# Patient Record
Sex: MALE | Race: BLACK OR AFRICAN AMERICAN | Employment: UNEMPLOYED | URBAN - METROPOLITAN AREA
[De-identification: names, ages, dates, MRNs, and addresses within clinical notes are randomized per-mention and may not be internally consistent; named-entity substitution may affect disease eponyms.]

---

## 2020-02-27 ENCOUNTER — OFFICE VISIT (OUTPATIENT)
Dept: FAMILY MEDICINE CLINIC | Age: 35
End: 2020-02-27

## 2020-02-27 VITALS
WEIGHT: 174 LBS | HEART RATE: 97 BPM | SYSTOLIC BLOOD PRESSURE: 118 MMHG | BODY MASS INDEX: 27.97 KG/M2 | OXYGEN SATURATION: 100 % | RESPIRATION RATE: 16 BRPM | TEMPERATURE: 98.7 F | DIASTOLIC BLOOD PRESSURE: 79 MMHG | HEIGHT: 66 IN

## 2020-02-27 DIAGNOSIS — Z82.49 FAMILY HISTORY OF HEART ATTACK: ICD-10-CM

## 2020-02-27 DIAGNOSIS — Z20.2 EXPOSURE TO SEXUALLY TRANSMITTED DISEASE (STD): ICD-10-CM

## 2020-02-27 DIAGNOSIS — Z20.2 EXPOSURE TO SEXUALLY TRANSMITTED DISEASE (STD): Primary | ICD-10-CM

## 2020-02-27 RX ORDER — METRONIDAZOLE 500 MG/1
2000 TABLET ORAL ONCE
Qty: 4 TAB | Refills: 0 | Status: SHIPPED | OUTPATIENT
Start: 2020-02-27 | End: 2020-02-27

## 2020-02-27 NOTE — PROGRESS NOTES
Darinel Paredes  37 y.o. male  1985  [id number]:[id number]    [facility] FAMILY MEDICINE  Progress Note     Encounter Date: 2/27/2020    Assessment and Plan:     Encounter Diagnoses     ICD-10-CM ICD-9-CM   1. Exposure to sexually transmitted disease (STD) Z20.2 V01.6   2. Family history of heart attack Z82.49 V17.3     1. Exposure to sexually transmitted disease (STD)  - metroNIDAZOLE (FLAGYL) 500 mg tablet; Take 4 Tabs by mouth once for 1 dose. Dispense: 4 Tab; Refill: 0  - CT/NG/T.VAGINALIS AMPLIFICATION; Future  - HBV CORE AB, IGG/IGM; Future  - HIV 1/2 AG/AB, 4TH GENERATION,W RFLX CONFIRM; Future  - T PALLIDUM SCREEN W/REFLEX; Future    2. Family history of heart attack  - METABOLIC PANEL, BASIC; Future  - LIPID PANEL; Future      I have discussed the diagnosis with the patient and the intended plan as seen in the above orders. he has expressed understanding. The patient has received an after-visit summary and questions were answered concerning future plans. I have discussed medication side effects and warnings with the patient as well. Electronically Signed: Fernando Salvador MD    Current Medications after this visit     Current Outpatient Medications   Medication Sig    metroNIDAZOLE (FLAGYL) 500 mg tablet Take 4 Tabs by mouth once for 1 dose. No current facility-administered medications for this visit.       Medications Discontinued During This Encounter   Medication Reason    cyclobenzaprine (FLEXERIL) 10 mg tablet Not A Current Medication    meloxicam (MOBIC) 15 mg tablet Not A Current Medication    HYDROcodone-acetaminophen (NORCO) 5-325 mg per tablet Not A Current Medication     ~~~~~~~~~~~~~~~~~~~~~~~~~~~~~~~~~~~~~~~~~~~~~~    Chief Complaint   Patient presents with   [address] [place] [address] Other      states that poss std/trichomoiasis       History provided by patient  History of Present Illness   Darinel Paredes is a 37 y.o. male who presents to clinic today for:      [address] patient who presents to establish PCP care. I personally reviewed and updated the patient's medical, surgical, family and social history. PREVIOUS PRIMARY CARE PROVIDER and SPECIALISTS  None. Patient decided to come to [address] due to current issue. RECORDS  Provided by patient: none     SPECIALISTS  Patient Care Team:  Angella Contreras MD as PCP - [facility])    Establish Care  Patient present with cc of establish care. Patient states that his girlfriend was diagnosed with trichomoniasis. He denies penile discharge, dysuria, rash or genital lesions. Health Maintenance  There are no preventive care reminders to display for this patient. Review of Systems   Review of Systems   Constitutional: Negative for chills and fever. HENT: Negative for congestion, ear discharge and sore throat. Eyes: Negative for double vision, photophobia and discharge. Respiratory: Negative for cough, sputum production, shortness of breath and wheezing. Cardiovascular: Negative for chest pain, palpitations and leg swelling. Gastrointestinal: Negative for diarrhea, nausea and vomiting. Genitourinary: Negative for dysuria and urgency. Skin: Negative. Neurological: Negative for dizziness, tremors and headaches. Vitals/Objective:     Vitals:    02/27/20 1503   BP: 118/79   Pulse: 97   Resp: 16   Temp: 98.7 °F (37.1 °C)   TempSrc: Oral   SpO2: 100%   Weight: 174 lb (78.9 kg)   Height: 5' 6\" (1.676 m)     Body mass index is 28.08 kg/m². Wt Readings from Last 3 Encounters:   02/27/20 174 lb (78.9 kg)   03/04/15 160 lb (72.6 kg)   05/22/14 160 lb (72.6 kg)       Physical Exam  Constitutional:       General: He is not in acute distress. Appearance: Normal appearance. He is well-developed. He is not diaphoretic. HENT:      Head: Normocephalic and atraumatic.       Right Ear: Tympanic membrane, ear canal and external ear normal.      Left Ear: Tympanic membrane, ear canal and external ear normal.      Mouth/Throat:      Pharynx: No oropharyngeal exudate or posterior oropharyngeal erythema. Eyes:      General:         Right eye: No discharge. Left eye: No discharge. Conjunctiva/sclera: Conjunctivae normal.   Cardiovascular:      Rate and Rhythm: Normal rate and regular rhythm. Heart sounds: S1 normal and S2 normal. No murmur. Pulmonary:      Effort: Pulmonary effort is normal.      Breath sounds: Normal breath sounds. No rales. Musculoskeletal:      Right lower leg: No edema. Left lower leg: No edema. Skin:     General: Skin is warm and dry. Capillary Refill: Capillary refill takes less than 2 seconds. Neurological:      Mental Status: He is alert and oriented to person, place, and time. No results found for this or any previous visit (from the past 24 hour(s)). Disposition     Follow-up and Dispositions  ·   Return if symptoms worsen or fail to improve. No future appointments. History   Patient's past medical, surgical and family histories were reviewed and updated. History reviewed. No pertinent past medical history. History reviewed. No pertinent surgical history.   Family History   Problem Relation Age of Onset    Heart Attack Mother 48    No Known Problems Father      Social History     Tobacco Use    Smoking status: Current Every Day Smoker    Smokeless tobacco: Current User   Substance Use Topics    Alcohol use: No    Drug use: No       Allergies   No Known Allergies

## 2020-02-27 NOTE — PROGRESS NOTES
Identified pt with two pt identifiers(name and ). Reviewed record in preparation for visit and have obtained necessary documentation. Chief Complaint   Patient presents with   [address] [facility] [address] Other     Pt states that poss std/trichomoiasis        Health Maintenance Due   Topic    Influenza Age 5 to Adult    -Pt declines flu shot     Coordination of Care Questionnaire:  :   1) Have you been to an emergency room, urgent care, or hospitalized since your last visit? If yes, where when, and reason for visit? Yes, [facility]  2. Have seen or consulted any other health care provider since your last visit? If yes, where when, and reason for visit? No         Patient is accompanied by self  I have received verbal consent from Patrick Marino to discuss any/all medical information while they are present in the room.

## 2020-02-27 NOTE — PATIENT INSTRUCTIONS
Exposure to Sexually Transmitted Infections: Care Instructions Your Care Instructions Sexually transmitted infections (STIs) are those diseases spread by sexual contact. There are at least 20 different STIs, including chlamydia, gonorrhea, syphilis, and human immunodeficiency virus (HIV), which causes AIDS. Bacteria-caused STIs can be treated and cured. STIs caused by viruses, such as HIV, can be treated but not cured. Some STIs can reduce a woman's chances of getting pregnant in the future. STIs are spread during sexual contact, such as vaginal intercourse and oral or anal sex. Follow-up care is a key part of your treatment and safety. Be sure to make and go to all appointments, and call your doctor if you are having problems. It's also a good idea to know your test results and keep a list of the medicines you take. How can you care for yourself at home? · Your doctor may have given you a shot of antibiotics. If your doctor prescribed antibiotic pills, take them as directed. Do not stop taking them just because you feel better. You need to take the full course of antibiotics. · Do not have sexual contact while you have symptoms of an STI or are being treated for an STI. · Tell your sex partner (or partners) that he or she will need treatment. · If you are a woman, do not douche. Douching changes the normal balance of bacteria in the vagina and may spread an infection up into your reproductive organs. To prevent exposure to STIs in the future · Use latex condoms every time you have sex. Use them from the beginning to the end of sexual contact. · Talk to your partner before you have sex. Find out if he or she has or is at risk for any STI. Keep in mind that a person may be able to spread an STI even if he or she does not have symptoms. · Do not have sex if you are being treated for an STI. · Do not have sex with anyone who has symptoms of an STI, such as sores on the genitals or mouth. · Having one sex partner (who does not have STIs and does not have sex with anyone else) is a good way to avoid STIs. When should you call for help? Call your doctor now or seek immediate medical care if: 
  · You have new pain in your belly or pelvis.  
  · You have symptoms of a urinary tract infection. These may include: 
? Pain or burning when you urinate. ? A frequent need to urinate without being able to pass much urine. ? Pain in the flank, which is just below the rib cage and above the waist on either side of the back. ? Blood in your urine. ? A fever.  
  · You have new or worsening pain or swelling in the scrotum.  
 Watch closely for changes in your health, and be sure to contact your doctor if: 
  · You have unusual vaginal bleeding.  
  · You have a discharge from the vagina or penis.  
  · You have any new symptoms, such as sores, bumps, rashes, blisters, or warts.  
  · You have itching, tingling, pain, or burning in the genital or anal area.  
  · You think you may have an STI. Where can you learn more? Go to http://bjorn-italia.info/. Enter I348 in the search box to learn more about \"Exposure to Sexually Transmitted Infections: Care Instructions. \" Current as of: September 11, 2018 Content Version: 12.2 © 4486-2828 [employer]. Care instructions adapted under license by [employer] (which disclaims liability or warranty for this information). If you have questions about a medical condition or this instruction, always ask your healthcare professional. [place] [address] any warranty or liability for your use of this information.

## 2020-02-29 LAB
BUN SERPL-MCNC: 8 MG/DL (ref 6–20)
BUN/CREAT SERPL: 10 (ref 9–20)
CALCIUM SERPL-MCNC: 9.7 MG/DL (ref 8.7–10.2)
CHLORIDE SERPL-SCNC: 102 MMOL/L (ref 96–106)
CHOLEST SERPL-MCNC: 184 MG/DL (ref 100–199)
CO2 SERPL-SCNC: 26 MMOL/L (ref 20–29)
CREAT SERPL-MCNC: 0.82 MG/DL (ref 0.76–1.27)
GLUCOSE SERPL-MCNC: 83 MG/DL (ref 65–99)
HBV CORE AB SERPL QL IA: NEGATIVE
HBV CORE IGM SERPL QL IA: NEGATIVE
HDLC SERPL-MCNC: 48 MG/DL
HIV 1+2 AB+HIV1 P24 AG SERPL QL IA: NON REACTIVE
LDLC SERPL CALC-MCNC: 115 MG/DL (ref 0–99)
POTASSIUM SERPL-SCNC: 4.3 MMOL/L (ref 3.5–5.2)
SODIUM SERPL-SCNC: 142 MMOL/L (ref 134–144)
TREPONEMA PALLIDUM IGG+IGM AB [PRESENCE] IN SERUM OR PLASMA BY IMMUNOASSAY: NON REACTIVE
TRIGL SERPL-MCNC: 105 MG/DL (ref 0–149)
VLDLC SERPL CALC-MCNC: 21 MG/DL (ref 5–40)

## 2020-03-01 LAB
C TRACH RRNA SPEC QL NAA+PROBE: NEGATIVE
N GONORRHOEA RRNA SPEC QL NAA+PROBE: NEGATIVE
T VAGINALIS DNA SPEC QL NAA+PROBE: POSITIVE

## 2020-03-04 ENCOUNTER — TELEPHONE (OUTPATIENT)
Dept: FAMILY MEDICINE CLINIC | Age: 35
End: 2020-03-04

## 2020-03-23 NOTE — TELEPHONE ENCOUNTER
Called pt verified id [id number]. F/u with patient in regards of his lab results. Patient stated that he has received it.

## 2025-08-15 ENCOUNTER — HOSPITAL ENCOUNTER (EMERGENCY)
Facility: HOSPITAL | Age: 40
Discharge: HOME | End: 2025-08-15

## 2025-08-15 ENCOUNTER — APPOINTMENT (OUTPATIENT)
Facility: HOSPITAL | Age: 40
End: 2025-08-15

## 2025-08-15 VITALS
TEMPERATURE: 98.3 F | OXYGEN SATURATION: 100 % | HEIGHT: 66 IN | WEIGHT: 165 LBS | SYSTOLIC BLOOD PRESSURE: 123 MMHG | BODY MASS INDEX: 26.52 KG/M2 | RESPIRATION RATE: 16 BRPM | DIASTOLIC BLOOD PRESSURE: 83 MMHG | HEART RATE: 77 BPM
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
## 2025-08-15 DIAGNOSIS — S39.012A STRAIN OF LUMBAR REGION, INITIAL ENCOUNTER: Primary | ICD-10-CM

## 2025-08-15 PROCEDURE — 72100 X-RAY EXAM L-S SPINE 2/3 VWS: CPT

## 2025-08-15 PROCEDURE — 6370000000 HC RX 637 (ALT 250 FOR IP): Performed by: NURSE PRACTITIONER

## 2025-08-15 PROCEDURE — 99283 EMERGENCY DEPT VISIT LOW MDM: CPT

## 2025-08-15 RX ORDER — IBUPROFEN 200 MG
600 TABLET ORAL
Status: COMPLETED | OUTPATIENT
Start: 2025-08-15 | End: 2025-08-15

## 2025-08-15 RX ORDER — METHOCARBAMOL 500 MG/1
750 TABLET, FILM COATED ORAL
Status: COMPLETED | OUTPATIENT
Start: 2025-08-15 | End: 2025-08-15

## 2025-08-15 RX ADMIN — IBUPROFEN 600 MG: 200 TABLET, FILM COATED ORAL at 19:59

## 2025-08-15 RX ADMIN — METHOCARBAMOL 750 MG: 500 TABLET ORAL at 19:59

## 2025-08-15 ASSESSMENT — PAIN SCALES - GENERAL
PAINLEVEL_OUTOF10: 7

## 2025-08-15 ASSESSMENT — PAIN DESCRIPTION - LOCATION: LOCATION: BACK;KNEE

## 2025-08-15 ASSESSMENT — LIFESTYLE VARIABLES
HOW OFTEN DO YOU HAVE A DRINK CONTAINING ALCOHOL: NEVER
HOW MANY STANDARD DRINKS CONTAINING ALCOHOL DO YOU HAVE ON A TYPICAL DAY: PATIENT DOES NOT DRINK

## 2025-08-15 ASSESSMENT — PAIN - FUNCTIONAL ASSESSMENT: PAIN_FUNCTIONAL_ASSESSMENT: 0-10
